# Patient Record
Sex: MALE | Race: BLACK OR AFRICAN AMERICAN | NOT HISPANIC OR LATINO | URBAN - METROPOLITAN AREA
[De-identification: names, ages, dates, MRNs, and addresses within clinical notes are randomized per-mention and may not be internally consistent; named-entity substitution may affect disease eponyms.]

---
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
## 2024-03-29 NOTE — ED PEDIATRIC NURSE NOTE - NS ED NURSE RECORD ANOTHER VITAL SIGN
Called mom back regarding questions about Chad's congestion. LVM for mom to call back for guidance.    Yes

## 2024-06-18 NOTE — ED PEDIATRIC NURSE NOTE - CAS TRG GEN SKIN CONDITION
Dry/Warm Render In Strict Bullet Format?: Yes Detail Level: Zone Plan: Advised if patients if needs refills to call with in a year Continue Regimen: clindamycin phosphate 1 % topical solution Mornings\\nQuantity: 60.0 ml  Days Supply: 30\\nSig: Apply to face and back in the AM\\n\\nadapalene 0.1 %-benzoyl peroxide 2.5 %-clindamycin 1 % topical gel \\nQuantity: 45.0 g  Days Supply: 30\\nSig: Apply a pea size amount to face QHS . Start off every other night as tolerated then gradually  every night.

## 2025-04-08 NOTE — ED PEDIATRIC NURSE NOTE - DISCHARGE DATE/TIME
Interventional Pain Management - Established Visit  Follow-Up      Interval History 4/8/2025:  The patient returns to clinic today for follow up of back pain. He is s/p bilateral SI joint injections on 3/21/2025. He reports limited relief. He continues to report low back and buttock pain. He denies any radicular leg pain. He does endorse worsened pain with bending over. He is taking Gabapentin. He is not taking Norco, as this is not helpful. He denies any other health changes. His pain today is 8/10.     Interval History 2/25/2025:  Uriah Mills is a 72 y.o. male who presents to the clinic for follow up evaluation and management of chronic low back pain. Last seen in clinic on 2/3 at which time pt was s/p lumbar RFA without much relief. We ordered repeat MRI and continued medication management. Today, pt continues to complain of low back and buttock pain. When sitting, pain is mostly in the buttocjk region, when pt stands, pain travels up his back. Pain is described as a dull pressure with occasional electric shock like sensations. Symptoms worse with walking, prolonged standing, bending over. Pain improved with lying flat. Current pain 8/10.   He continues to use Gabapentin 600 BID. Stopped methocarbamol due to lack of efficacy. No longer using Hydrocodone.   Additionally, pt reports worsening right shoulder pain. Hx of impingement syndrome, previous benefit with right shoulder. Overall, pt is very sedentary and spends the majority of his day either lying down or sitting in a recliner.     Interval History 2/3/2025:  Uriah Mills returns to clinic for follow-up after bilateral L3,4,5 RFFA on 1/2/2025. He reports limited relief of his back pain. He states he has taken Norco in the past without relief. He states the last time he took a pain pill was mid-December. He also takes  Gabapentin 600 mg BID with some relief. He took methocarbamol a couple times without relief. He denies recent health changes. He denies  recent falls or trauma. He denies new onset fever/night sweats, urinary incontinence, bowel incontinence, significant weight changes, significant motor weakness or changes, or loss of sensations. His pain today is 8/10.      Interval history 12/12/2024:   Uriah Mills is following up in the clinic for chronic lower back pain.  Patient continues to achy sore dull lower back pain that is distributed in a bandlike pattern with some of the pain radiating to his buttocks.  Patient said the pain is not traveling down his legs. The pain is worse with prolonged standing, walking and getting up from a sitting position. The lumbar paraspinal trigger point injections from his last visit provided limited relief. This pain was alleviated with RFA in the past. Patient is status post piriformis and SI joint injections with limited relief.  Patient is having limited pain relief with gabapentin and Robaxin. He denies new onset fever/night sweats, urinary incontinence, bowel incontinence, significant weight changes, significant motor weakness or changes, or loss of sensations. His pain today is 9/10.    Interval History 11/19/2024:  Uriah Mills returns to clinic for follow-up of chronic lower back pain. He present with his wife, Yamilex, who aids in providing history. He states that about 10 days ago, he was sitting up in bed watching TV. He had taken a Xanax earlier. He is not sure if he passed out or fell asleep, but he fell out of bed. He hit his right upper back/side, right hand and right ear on his bedside table. The fall woke him up. They did not present to the ED. He denies headaches, blurred vision, change in memory or any other signs of head trauma. He is having new pain in the upper back. He denies recent health changes. He denies new onset fever/night sweats, urinary incontinence, bowel incontinence, significant weight changes, significant motor weakness or changes, or loss of sensations. His pain today is 9/10.       Interval History 7/9/2023:  Patient is here for follow up after BL SIJ and right Piriformis injection on 5/52024 with 0% pain relief.  Today reports pain is mostly in his right lower back, worse with prolonged sitting, rotation of his back.  Denies leg pain or groin pain, denies new weakness, numbness, falls.  Patient is not participating in PT or HEP because it worsens his pain.  Today, pain is 5/10.      Interval History 5/10/2024:  Uriah Mills returns to clinic s/p right then left  L3, L4, L5 RFA on 4/12/2024 and 4/26/2024 respectively.  He reports 80% relief from these procedures. He also reports 80% relief at least from the previous bilateral SIJ injections. He reports return of his bilateral SIJ pain right > left with a new pain in his right buttock that radiates down the posterior aspect of his right thigh.  He describes the pain as shooting, dull.  Exacerbating factors: moving from sitting to standing, sitting up in bed. Mitigating factors: resting.  He has previously received Noco from his PCP last fill 10/31/2023.  He is inquiring about restarting pain medications.  He does not participate in HEP or PT currently. The patient denies fever/night sweats, urinary incontinence, bowel incontinence, significant weight changes, significant motor weakness or changes, or loss of sensations. His pain today is 8/10.     Interval History 1/26/2024:  The patient is here today for follow up of back pain. He is s/p bilateral SI joint injection with 60% relief. His primary complaint today is lower back pain, higher than previous injection site. It is stabbing and throbbing in nature. No radiation into the legs. There is associated stiffness. He has a lot of pain and stiffness first thing in the morning. He has completed PT multiple times without much improvement in symptoms. He continues with HEP. His pain today is 5/10.    Interval History 12/15/2023:  Uriah Mills presents to the clinic for a follow-up  appointment for low back and hip pain. Since the last visit, Uriah Mills states the pain has been persistant. Current pain intensity is 8/10 but he reports it is usually a 10/10. He has been tolerating his pain over the past few years but had a flare up in October which led him to schedule this appointment.     Interval History (12/14/21):  He returns today for follow up.  He reports that bilateral sacroiliac joint steroid injections has been helpful for the bilateral low back pain.  He reports roughly 60% relief.  He is happy with these results and does not feel that further treatment needed at this time.  He is not interested in participating in further physical therapy.     Interval History (11/11/21):  He returns today for follow up.  He reports that bilateral lumbar radiofrequency ablations has been helpful for the bilateral lumbar pain.  He reports 95% relief of lumbar back pain.  He states that he continues to have pain in the bilateral lumbosacral regions over the bilateral sacroiliac joints.  This pain radiates to the bilateral buttocks and lateral hips.  He denies any associated numbness, tingling, weakness, bowel bladder dysfunction.     Interval History (7/1/21):  He returns today for follow up.  He reports that bilateral S1 transforaminal epidural steroid injection has been helpful for the bilateral low back and lower extremity pain.  He reports roughly 40% relief.  He continues to have some back pain in the bilateral lower lumbar/lumbosacral region.  The pain does not radiate.  He denies any other changes in the quality or location was pain.  He denies any new or worsening symptoms.     Interval History (1/19/21):  He returns today for follow up.  He reports that his pain is unchanged in quality location since last encounter.  He denies any new or worsening symptoms.  We have received records from previous pain management provider.  Records indicate the patient has undergone multiple lumbar  interventional procedures including sacroiliac joint injections, lumbar radiofrequency ablations, and bilateral L4 and L5 transforaminal epidural steroid injections.  Patient denies any significant relief from these procedures.     Initial Encounter (1/5/21):  Uriah Mills is a 69 y.o. male who presents today with chronic bilateral low back and lower extremity pain.  Patient has had this problem for many years.  No specific inciting event or injury noted.  The pain is located across the lower lumbar region radiate to the bilateral hips and posterior thighs.  Patient reports associated numbness in the right posterior thigh.  He denies any pain or numbness distal to the knees.  He denies any focal weakness or bowel bladder dysfunction.  The pain is constant worse with activity.  Patient reports undergoing multiple interventional procedures in the past with Dr. Kin Palomino.  He states that no these procedures were particularly helpful.  He was also tried on multiple opioid pain medications.  Some of these help but they also cause significant constipation and therefore these medications were not continued.  More recently, patient has been taking tramadol prescribed by his primary care physician.  He states this medication does not cause any problems but also did not provide any significant relief..   This pain is described in detail below.    Pain Disability Index Review:      4/8/2025     7:42 AM 2/25/2025     8:26 AM 2/3/2025    11:46 AM   Last 3 PDI Scores   Pain Disability Index (PDI) 54 [phone] 56       Pain Medications:    - Opioids: Lorcet (Hydrocodone/Acetaminophen)  Hydrocodone 2 weeks since he took any, wasn't helping pain much  gabapentin    Opioid Contract: no     report:  Reviewed and inconsistent with medication use as prescribed. (Pt report he has stopped taking it).    Pain Procedures:   October 2021: RFA of bilateral L3, L4, L5  June 2021: Transforaminal epidural at bilateral S1  December 2021:  Bilateral sacroiliac joint injection   1/11/24 B/L SI joint injections- 80% relief  4/12/2024 - Right L3, L4, L5 RFA - 80% relief  4/26/2024 - Left L3, L4, L5 RFA - 80% relief  5/24/2024: Bilateral Sacroiliac Joint Injection under Fluoroscopic Guidance and Right Piriformis Muscle Injection under Fluoroscopic Guidance - 80% relief  1/2/2025 - Bilateral L3,4,5 RFA - limited relief  3/21/2025- Bilateral SI joint injection- limited relief.     Physical Therapy/Home Exercise: no (has tried accupuncture without significant relief)    Imaging:  MRI Lumbar Spine 2/17/2025:  COMPARISON:  11/19/2024 and 12/22/2023     FINDINGS:  Alignment: Normal.     Vertebrae: 5 lumbar-type vertebral bodies. No aggressive marrow replacement process or fracture.     Discs: Annular fissure L4-5     Cord: Normal. Conus terminates at L1.     Degenerative findings:     T12-L1: There is no focal disc herniation. No significant central canal narrowing . No significant neural foraminal narrowing.     L1-L2: There is no focal disc herniation. No significant central canal narrowing . No significant neural foraminal narrowing.     L2-L3: There is no focal disc herniation. No significant central canal narrowing . No significant neural foraminal narrowing.     L3-L4: There is no focal disc herniation. No significant central canal narrowing . No significant neural foraminal narrowing.     L4-L5: Broad based mild diffuse bulging of disc material .  Facet hypertrophy.  No significant central canal narrowing . No significant neural foraminal narrowing.     L5-S1: Broad based mild diffuse bulging of disc material with small left posterocentral protrusion.  Facet hypertrophy.  Mild central canal narrowing .  Mild bilateral neural foraminal narrowing.     Paraspinal muscles & soft tissues: Abdominal aortic aneurysm measuring 4.8*3.7 cm.     Impression:     No significant interval detrimental change in lumbar spine from 12/22/2023.  As above.     4.8 x 3.7 cm  abdominal aortic aneurysm.     MRI lumbar spine 2020    Alignment: Normal.     Vertebrae: 5 lumbar-type vertebral bodies. No aggressive marrow replacement process or fracture.     Discs: Satisfactory height.  Mild diffuse desiccation of disc material predominantly L4-L5 and L5-S1     Cord: Normal. Conus terminates at T12-L1.     Degenerative findings:     T12-L1: There is no focal disc herniation.No significant central canal narrowing . No significant neural foraminal narrowing.     L1-L2: There is no focal disc herniation.No significant central canal narrowing . No significant neural foraminal narrowing.     L2-L3: There is no focal disc herniation.No significant central canal narrowing . No significant neural foraminal narrowing.     L3-L4: There is no focal disc herniation.No significant central canal narrowing . No significant neural foraminal narrowing.     L4-L5: Mild broad-based bulging of disc material with small superimposed central protrusion.  No significant central canal narrowing . No significant neural foraminal narrowing.     L5-S1: Mild broad-based bulging of disc material with small superimposed central protrusion.  No significant central canal narrowing . No significant neural foraminal narrowing.     Paraspinal muscles & soft tissues: Unremarkable.     Impression:     Degenerative changes disc space level 4-L5 and L5-S1. no significant central or foraminal stenosis.    Allergies: Review of patient's allergies indicates:  No Known Allergies    Current Medications:   Current Outpatient Medications   Medication Sig Dispense Refill    albuterol (PROVENTIL) 2.5 mg /3 mL (0.083 %) nebulizer solution Take 3 mLs (2.5 mg total) by nebulization every 6 (six) hours as needed for Wheezing. Rescue 150 mL 5    albuterol (PROVENTIL/VENTOLIN HFA) 90 mcg/actuation inhaler INHALE 2 PUFFS BY MOUTH EVERY 6 HOURS AS NEEDED 27 g 3    ALPRAZolam (XANAX) 1 MG tablet Take 1 tablet (1 mg total) by mouth nightly as needed  for Anxiety. 30 tablet 2    aspirin (ECOTRIN) 81 MG EC tablet Take 81 mg by mouth once daily.      cyclobenzaprine (FLEXERIL) 5 MG tablet Take 1 tablet (5 mg total) by mouth nightly as needed for Muscle spasms. 30 tablet 2    dexAMETHasone (DECADRON) 4 MG Tab Take 1 tablet (4 mg total) by mouth every 12 (twelve) hours. 14 tablet 1    gabapentin (NEURONTIN) 600 MG tablet Take 1 tablet by mouth twice daily 60 tablet 0    levocetirizine (XYZAL) 5 MG tablet TAKE 1 TABLET BY MOUTH ONCE DAILY IN THE EVENING 90 tablet 2    methocarbamoL (ROBAXIN) 500 MG Tab Take 1 tablet (500 mg total) by mouth 4 (four) times daily as needed (muscle spasm). 80 tablet 0    BREZTRI AEROSPHERE 160-9-4.8 mcg/actuation HFAA INHALE 1 PUFF BY MOUTH TWICE DAILY 11 g 2    hydrocortisone 2.5 % cream Apply topically 2 (two) times daily. (Patient not taking: Reported on 4/8/2025) 28 g 2     No current facility-administered medications for this visit.     Facility-Administered Medications Ordered in Other Visits   Medication Dose Route Frequency Provider Last Rate Last Admin    0.9%  NaCl infusion   Intravenous Continuous Rory Griffith MD        0.9%  NaCl infusion   Intravenous Continuous Jovanny Gurrola MD        ALPRAZolam dissolvable tablet 1 mg  1 mg Oral Once PRN Colt Lenz Jr., MD        BUPivacaine (PF) 0.25% (2.5 mg/ml) injection 25 mg  10 mL Intramuscular Once Colt Lenz Jr., MD        ceFAZolin 2 g in dextrose 5 % in water (D5W) 100 mL IVPB (MB+)  2 g Intravenous On Call Procedure Rory Griffith MD        dexAMETHasone sodium phos (PF) injection 10 mg  10 mg Epidural Once Colt Lenz Jr., MD        LIDOcaine (PF) 10 mg/ml (1%) injection 10 mg  1 mL Other Once Colt Lenz Jr., MD        LIDOcaine (PF) 10 mg/ml (1%) injection 10 mg  1 mL Intradermal Once PRN Rory Griffith MD        LIDOcaine HCL 20 mg/ml (2%) injection 10 mL  10 mL Other Once Colt Lenz Jr., MD        LIDOcaine HCL 20 mg/ml  (2%) injection 10 mL  10 mL Other Once Colt Lenz Jr., MD           REVIEW OF SYSTEMS:    GENERAL:  No weight loss, malaise or fevers.  HEENT:  Negative for frequent or significant headaches.  NECK:  Negative for lumps, goiter, pain and significant neck swelling.  RESPIRATORY:  Negative for cough, wheezing or shortness of breath. COPD  CARDIOVASCULAR:  Negative for chest pain, leg swelling or palpitations.  GI:  Negative for abdominal discomfort, blood in stools or black stools or change in bowel habits.  MUSCULOSKELETAL:  See HPI.  SKIN:  Negative for lesions, rash, and itching.  PSYCH:  Negative for sleep disturbance, mood disorder and recent psychosocial stressors.  HEMATOLOGY/LYMPHOLOGY:  Negative for prolonged bleeding, bruising easily or swollen nodes.  NEURO:   No history of headaches, syncope, paralysis, seizures or tremors.  All other reviewed and negative other than HPI.    Past Medical History:  Past Medical History:   Diagnosis Date    Allergy     Arthritis     BPH (benign prostatic hyperplasia)     Carpal tunnel syndrome     Chronic hip pain, bilateral     Chronic low back pain     COPD (chronic obstructive pulmonary disease)     DDD (degenerative disc disease), lumbar     Dorsalgia     Gastrointestinal disease     Lumbar radiculopathy     Lumbar spondylosis     Mass of right wrist 06/05/2024    Osteoarthritis of both hips     Osteoarthritis of spine     Skin cancer        Past Surgical History:  Past Surgical History:   Procedure Laterality Date    APPENDECTOMY      CARPAL TUNNEL RELEASE Right 08/12/2019    Procedure: RELEASE, CARPAL TUNNEL right;  Surgeon: Roopa Hanna MD;  Location: [facility];  Service: Orthopedics;  Laterality: Right;    COLONOSCOPY      COLONOSCOPY      COLONOSCOPY N/A 9/18/2024    Procedure: COLONOSCOPY;  Surgeon: Lake Galeana MD;  Location: [facility];  Service: Endoscopy;  Laterality: N/A;    CYSTOSCOPY WITH INSERTION OF MINIMALLY INVASIVE IMPLANT TO  ENLARGE PROSTATIC URETHRA N/A 11/29/2023    Procedure: CYSTOSCOPY, WITH INSERTION OF UROLIFT IMPLANT;  Surgeon: Rory Griffith MD;  Location: [facility] OR;  Service: Urology;  Laterality: N/A;    CYSTOSCOPY, WITH INSERTION OF UROLIFT IMPLANT  11/29/2023    EPIDURAL STEROID INJECTION INTO LUMBAR SPINE Bilateral 06/17/2021    Bilateral ALVIN per  06/17/2021    EXCISION OF GANGLION OF WRIST Right 06/05/2024    Procedure: RIGHT VOLAR WRIST GANGLION CYST EXCISION;  Surgeon: Roopa Hanna MD;  Location: [facility] OR;  Service: Orthopedics;  Laterality: Right;  MAC/REGIONAL    GASTRIC FUNDOPLICATION      HERNIA REPAIR  1990s    INJECTION OF ANESTHETIC AGENT AROUND MEDIAL BRANCH NERVES INNERVATING LUMBAR FACET JOINT Bilateral 07/15/2021    Procedure: Block-nerve-medial branch-lumbar---BILATERAL L3, L4, L5;  Surgeon: Colt Lenz Jr., MD;  Location: [facility]IN [facility];  Service: Pain Management;  Laterality: Bilateral;    INJECTION OF ANESTHETIC AGENT AROUND MEDIAL BRANCH NERVES INNERVATING LUMBAR FACET JOINT Bilateral 09/23/2021    Procedure: Block-nerve-medial branch-lumbar---BILATERAL L3, L4, L5;  Surgeon: Colt Lenz Jr., MD;  Location: [facility];  Service: Pain Management;  Laterality: Bilateral;    INJECTION OF ANESTHETIC AGENT AROUND NERVE Bilateral 02/16/2024    Procedure: BLOCK, NERVE BILATERAL L3, 4, 5 MEDIAL BRANCH;  Surgeon: Jeanmarie Jauregui MD;  Location: [facility] PAIN [facility]GT;  Service: Pain Management;  Laterality: Bilateral;  [phone]    INJECTION OF ANESTHETIC AGENT AROUND NERVE Bilateral 03/08/2024    Procedure: BLOCK, NERVE BILATERAL L3, L4, AND L5 MEDIAL BRANCH;  Surgeon: Jeanmarie Jauregui MD;  Location: [facility] PAIN [facility]GT;  Service: Pain Management;  Laterality: Bilateral;  [phone]    INJECTION OF JOINT Bilateral 12/02/2021    Procedure: Injection, Joint---BILATERAL SACROILIAC INJECTION;  Surgeon: Colt Lenz Jr., MD;  Location: [facility];  Service: Pain Management;   Laterality: Bilateral;    INJECTION OF JOINT N/A 05/24/2024    Procedure: INJECTION, BILATERAL SI AND RIGHT PIRIFORMIS;  Surgeon: Jeanmarie Jauregui MD;  Location: [facility] PAIN [facility]GT;  Service: Pain Management;  Laterality: N/A;  [phone]  2 WK F/U ESHRAGHI    INJECTION OF STEROID Left 08/12/2019    Procedure: INJECTION, STEROID;  Surgeon: Roopa Hanna MD;  Location: [facility] OR;  Service: Orthopedics;  Laterality: Left;    INJECTION, SACROILIAC JOINT Bilateral 01/11/2024    Procedure: INJECTION,SACROILIAC JOINT BILATERAL;  Surgeon: Jeanmarie Jauregui MD;  Location: [facility] PAIN [facility]GT;  Service: Pain Management;  Laterality: Bilateral;  [phone]    INJECTION, SACROILIAC JOINT Bilateral 3/21/2025    Procedure: INJECTION,SACROILIAC JOINT BILATERAL;  Surgeon: Jeanmarie Jauregui MD;  Location: [facility] PAIN [facility]GT;  Service: Pain Management;  Laterality: Bilateral;  2 WK F/U NICO    PYELOGRAM, RETROGRADE (Bilateral)    11/29/2023    RADIOFREQUENCY ABLATION Left 10/14/2021    Procedure: Radiofrequency Ablation---LEFT L3, L4, L5;  Surgeon: Colt Lenz Jr., MD;  Location: [facility] PAIN [facility];  Service: Pain Management;  Laterality: Left;    RADIOFREQUENCY ABLATION Right 10/28/2021    Procedure: Radiofrequency Ablation---RIGHT L3, L4, L5;  Surgeon: Colt Lenz Jr., MD;  Location: [facility] PAIN [facility];  Service: Pain Management;  Laterality: Right;    RADIOFREQUENCY ABLATION Right 04/12/2024    Procedure: RADIOFREQUENCY ABLATION RIGHT L3, L4, AND L5 1 OF 2;  Surgeon: Jeanmarie Jauregui MD;  Location: [facility] PAIN [facility]GT;  Service: Pain Management;  Laterality: Right;  [phone]    RADIOFREQUENCY ABLATION Left 04/26/2024    Procedure: RADIOFREQUENCY ABLATION LEFT L3, L4, AND L5 2 OF 2;  Surgeon: Jeanmarie Jauregui MD;  Location: [facility] PAIN MGT;  Service: Pain Management;  Laterality: Left;  [phone]    RADIOFREQUENCY ABLATION Bilateral 1/2/2025    Procedure: RADIOFREQUENCY ABLATION BILATERAL L3, L4, L5;  Surgeon: Jeanmarie Jauregui,  MD;  Location: [facility] PAIN [facility]GT;  Service: Pain Management;  Laterality: Bilateral;  4 WK F/U NICO    RETROGRADE PYELOGRAPHY Bilateral 11/29/2023    Procedure: PYELOGRAM, RETROGRADE;  Surgeon: Rory Griffith MD;  Location: [facility] OR;  Service: Urology;  Laterality: Bilateral;    ROTATOR CUFF REPAIR Right     SACROILIAC JOINT INJECTION Bilateral 12/02/2021    SKIN CANCER EXCISION Left     arm    TRANSFORAMINAL EPIDURAL INJECTION OF STEROID Bilateral 06/17/2021    Procedure: Injection,steroid,epidural,transforaminal approach---BILATERAL S1;  Surgeon: Colt Lenz Jr., MD;  Location: [facility] PAIN [facility];  Service: Pain Management;  Laterality: Bilateral;    TURBT (TRANSURETHRAL RESECTION OF BLADDER TUMOR) N/A 11/29/2023    Procedure: TURBT (TRANSURETHRAL RESECTION OF BLADDER TUMOR);  Surgeon: Rory Griffith MD;  Location: [facility] OR;  Service: Urology;  Laterality: N/A;  with UroLift and with bilateral retrograde pyelograms    URBT (TRANSURETHRAL RESECTION OF BLADDER TUMOR)  11/29/2023       Family History:  Family History   Problem Relation Name Age of Onset    Diabetes Mother Marly Mills     No Known Problems Father       Social History:  Social History     Socioeconomic History    Marital status:    Tobacco Use    Smoking status: Every Day     Types: Cigars     Passive exposure: Current    Smokeless tobacco: Never    Tobacco comments:     8 small cigars per day down from 10.   Substance and Sexual Activity    Alcohol use: No    Drug use: Yes     Types: Benzodiazepines    Sexual activity: Not Currently     Partners: Female     Birth control/protection: None     OBJECTIVE:    /63 (Patient Position: Sitting)   Pulse 76   Temp 97.7 °F (36.5 °C)   Wt 75.8 kg (167 lb 1.7 oz)   SpO2 (!) 94%   BMI 23.98 kg/m²     PHYSICAL EXAMINATION:     General appearance: Well appearing, in no acute distress, alert and oriented x3.  Psych:  Mood and affect appropriate.  Skin: Skin color, texture, turgor  normal, no rashes or lesions, in both upper and lower body.  Head/face:  Atraumatic, normocephalic. No palpable lymph nodes  Back: Straight leg raise in the sitting position is negative for radicular leg pain. There is pain with palpation over lumbar facet joints bilaterally. Limited ROM with pain on flexion and extension.   Extremities:  No deformities, edema, or skin discoloration. Good capillary refill.  Musculoskeletal: Lower extremity strength is normal and symmetric.  No atrophy or tone abnormalities are noted.   Neuro: No loss of sensation is noted.  Gait: Antalgic- ambulates without assistance.    ASSESSMENT: 72 y.o. year old male with low back pain, consistent with the followin. Chronic pain disorder        2. Annular tear of lumbar disc        3. Lumbar radiculopathy  gabapentin (NEURONTIN) 600 MG tablet      4. Lumbar spondylosis        5. Chronic bilateral low back pain with bilateral sciatica  gabapentin (NEURONTIN) 600 MG tablet      6. Degeneration of intervertebral disc of lumbar region with discogenic back pain        7. Sacroiliac joint dysfunction of both sides              Plan:    - Previous imaging reviewed, labs reviewed.     - He is s/p bilateral SI joint injections with limited relief.     - Schedule for L4/5 IL ALVIN.     - Continue Gabapentin 600 mg BID, refill provided.     - Pain contract signed 2024.     - He is not currently taking Norco due to limited benefit. Consider rotation to Percocet.     - Continue home exercise routine.     - RTC 2 weeks after above procedure.     The above plan and management options were discussed at length with patient. Patient is in agreement with the above and verbalized understanding.     Alissa Waite NP  [id number]             29-Sep-2018 16:59

## 2025-05-17 ENCOUNTER — EMERGENCY (EMERGENCY)
Facility: HOSPITAL | Age: 20
LOS: 0 days | Discharge: HOME | End: 2025-05-17
Payer: COMMERCIAL

## 2025-05-17 VITALS
HEART RATE: 65 BPM | DIASTOLIC BLOOD PRESSURE: 86 MMHG | SYSTOLIC BLOOD PRESSURE: 144 MMHG | HEIGHT: 74 IN | RESPIRATION RATE: 18 BRPM | WEIGHT: 237 LBS | TEMPERATURE: 98 F | OXYGEN SATURATION: 96 %

## 2025-05-17 DIAGNOSIS — J45.909 UNSPECIFIED ASTHMA, UNCOMPLICATED: ICD-10-CM

## 2025-05-17 DIAGNOSIS — R23.4 CHANGES IN SKIN TEXTURE: ICD-10-CM

## 2025-05-17 DIAGNOSIS — L81.8 OTHER SPECIFIED DISORDERS OF PIGMENTATION: ICD-10-CM

## 2025-05-17 DIAGNOSIS — Z91.018 ALLERGY TO OTHER FOODS: ICD-10-CM

## 2025-05-17 PROCEDURE — 99283 EMERGENCY DEPT VISIT LOW MDM: CPT

## 2025-05-17 RX ORDER — ALBUTEROL SULFATE 2.5 MG/3ML
2 VIAL, NEBULIZER (ML) INHALATION
Qty: 1 | Refills: 0
Start: 2025-05-17 | End: 2025-05-21